# Patient Record
Sex: MALE | Race: WHITE
[De-identification: names, ages, dates, MRNs, and addresses within clinical notes are randomized per-mention and may not be internally consistent; named-entity substitution may affect disease eponyms.]

---

## 2018-08-26 ENCOUNTER — HOSPITAL ENCOUNTER (EMERGENCY)
Dept: HOSPITAL 80 - FED | Age: 33
Discharge: HOME | End: 2018-08-26
Payer: COMMERCIAL

## 2018-08-26 VITALS — DIASTOLIC BLOOD PRESSURE: 88 MMHG | SYSTOLIC BLOOD PRESSURE: 177 MMHG

## 2018-08-26 DIAGNOSIS — J20.9: Primary | ICD-10-CM

## 2018-08-26 DIAGNOSIS — R09.02: ICD-10-CM

## 2018-08-26 DIAGNOSIS — J45.909: ICD-10-CM

## 2018-08-26 NOTE — EDPHY
H & P


Stated Complaint: sob low o2 sat 1 mos hx pnuemonia strep


Time Seen by Provider: 08/26/18 19:12





- Personal History


Current Tetanus/Diphtheria Vaccine: Yes


Current Tetanus Diphtheria and Acellular Pertussis (TDAP): Yes





- Medical/Surgical History


Hx Asthma: No


Hx Chronic Respiratory Disease: No


Hx Diabetes: No


Hx Cardiac Disease: No


Hx Renal Disease: No


Hx Cirrhosis: No


Hx Alcoholism: No


Hx HIV/AIDS: No


Hx Splenectomy or Spleen Trauma: No





- Social History


Smoking Status: Current every day smoker


Constitutional: 


 Initial Vital Signs











Temperature (C)  37.2 C   08/26/18 19:07


 


Heart Rate  109 H  08/26/18 19:07


 


Respiratory Rate  20   08/26/18 19:07


 


Blood Pressure  167/133 H  08/26/18 19:07


 


O2 Sat (%)  86 L  08/26/18 19:07








 











O2 Delivery Mode               Room Air


 


O2 (L/minute)                  2














Allergies/Adverse Reactions: 


 





No Known Allergies Allergy (Unverified 08/26/18 19:06)


 








Home Medications: 














 Medication  Instructions  Recorded


 


Allegra Allergy  08/26/18


 


Azithromycin [Zithromax] 250 mg PO DAILY #6 tab 08/26/18


 


predniSONE 40 mg PO DAILY #10 tab 08/26/18














Medical Decision Making





- Diagnostics


Imaging Results: 


 Imaging Impressions





Chest X-Ray  08/26/18 19:19


Impression: 1. Airways disease.


2. There are vague noncalcified nodular densities in each upper lung. These 

could potentially be secondary to mucous plugging with or without underlying 

bronchiectasis. Is there a history of cystic fibrosis?


 


Results discussed with Dr. Ford.


 











Imaging: Discussed imaging studies w/ On call Radiologist, I viewed and 

interpreted images myself


ED Course/Re-evaluation: 





CHIEF COMPLAINT: Shortness of breath 





HISTORY OF PRESENT ILLNESS: 


The patient is a 34 y/o male with a history of asthma and hypertension 

complaining of worsening shortness of breath, onset two weeks ago. Around two 

months ago he was diagnosed with pneumonia and successfully treated with 

antibiotics. Around two weeks ago he developed a head cold associated with post-

nasal drip. The head cold progressed and he developed a cough and shortness of 

breath. Today he noticed that the shortness of breath was worse and decided to 

present to the emergency department. He denies fevers or chills. Denies chest 

pain, abdominal pain, urinary or bowel complaints, numbness, paresthesias, 

headache. 





REVIEW OF SYSTEMS:  





A 10 point review of systems was performed and is negative with the exception 

of the elements mentioned in the history of present illness.





PHYSICAL EXAM:  





HR, BP, O2 Sat, RR.  Temp noted


General Appearance:  Alert, well hydrated, appropriate, and non-toxic appearing.


Head:  Atraumatic without scalp tenderness or obvious injury


Eyes:  Pupils equal, round, reactive to light and accommodation, EOMI, no trauma

, no injection.


Ears:  Clear bilaterally, no perforation, normal landmarks


Nose:  Atraumatic, no rhinorrhea, clear.


Throat:  There is no erythema or exudates, no lesions, normal tonsils, mucus 

membranes moist.


Neck:  Supple, 2+ carotid upstroke, nontender, no lymphadenopathy.


Respiratory: Bilateral increased end-expiratory phase, end expiratory wheezes, 

coarse rhonchi throughout. No retractions, no distress, and no accessory muscle 

use. 


Cardiovascular:  Regular rate and rhythm, no murmurs, rubs, or gallops. 

Bilateral carotid, radial, dorsalis pedis, and posterior tibial pulses intact. 

Good capillary refill all extremities.


Gastrointestinal:  Abdomen is soft, nontender, non-distended, no masses, no 

rebound, no guarding, no peritoneal signs.


Musculoskeletal:  Normal active ROM of all extremities, atraumatic.


Neurological:  Alert, appropriate, and interactive.  The patient has normal 

DTRs and non-focal cranial nerves, motor, sensory, and cerebellar exam.


Skin:  No rashes, good turgor, no nodules on palpation.





Past medical history:Pneumonia, asthma, hypertension


Past surgical history: Denies


Family history: Denies


Social history: Lives in [place], employed as a CNA, smoker





DIAGNOSTICS/PROCEDURES/CRITICAL CARE TIME:  





Chest x-ray: Airways disease





DIFFERENTIAL DIAGNOSIS:   


The differential diagnosis for the patient's shortness of breath and hypoxemia 

included but was not limited to airways disease, pneumonia, myocardial 

infarction, acute mountain sickness, high altitude pulmonary edema, congestive 

heart failure, and pulmonary embolus.





MEDICAL DECISION MAKING:  


The patient is a 34 y/o male with a history of asthma and hypertension 

presenting with worsening shortness of breath and a cough, onset two weeks ago. 

On exam he has bilateral increased end-expiratory phase, end expiratory wheezes

, and coarse rhonchi throughout. His O2Sats are currently 89%. Chest x-ray 

ordered; two DuoNebs, 60mg PO Prednisone, and 500mg PO Azithromycin 

administered.





2015: Reassessed patient, he is feeling better after medications; additional 

DuoNeb administered. Chest x-ray still pending at this time.





2034: I spoke with Dr. Oppenheimer, radiologist, regarding this patient's chest 

x-ray.





2035: Reassessed patient and discussed chest x-ray findings. I have offered him 

an admission, which he has declined. He states that he will monitor his O2Sats 

as he is a CNA. He also has an appointment with his PCP to address his 

hypertension and asthma. I have prescribed him Zithromax, Prednisone, and a 

Proventil inhaler. Return precautions provided; patient is comfortable with 

this plan.








- Data Points


Medications Given: 


 








Discontinued Medications





Albuterol Sulfate (Proventil Inh Prepack)  1 mdi TAKEHOME EDNOW ONE


   Stop: 08/26/18 20:41


   Last Admin: 08/26/18 20:53 Dose:  1 mdi


Albuterol/Ipratropium (Duoneb)  3 ml IH EDNOW ONE


   Stop: 08/26/18 19:15


   Last Admin: 08/26/18 19:16 Dose:  3 ml


Albuterol/Ipratropium (Duoneb)  3 ml IH EDNOW ONE


   Stop: 08/26/18 19:19


   Last Admin: 08/26/18 19:19 Dose:  3 ml


Albuterol/Ipratropium (Duoneb)  3 ml IH EDNOW ONE


   Stop: 08/26/18 20:19


   Last Admin: 08/26/18 20:20 Dose:  3 ml


Azithromycin (Zithromax)  500 mg PO EDNOW ONE


   PRN Reason: Protocol


   Stop: 08/26/18 19:19


   Last Admin: 08/26/18 19:27 Dose:  500 mg


Prednisone (Prednisone)  60 mg PO EDNOW ONE


   Stop: 08/26/18 19:19


   Last Admin: 08/26/18 19:27 Dose:  60 mg








Departure





- Departure


Disposition: Home, Routine, Self-Care


Clinical Impression: 


 Hypoxemia





Acute bronchitis


Qualifiers:


 Bronchitis organism: other organism Qualified Code(s): J20.8 - Acute 

bronchitis due to other specified organisms





Reactive airway disease


Qualifiers:


 Asthma severity: unspecified severity Asthma persistence: unspecified Asthma 

complication type: uncomplicated Qualified Code(s): J45.909 - Unspecified asthma

, uncomplicated





Dyspnea


Qualifiers:


 Dyspnea type: shortness of breath Qualified Code(s): R06.02 - Shortness of 

breath





Condition: Good


Instructions:  Acute Bronchitis (ED), Reactive Airways Disease (ED), Dyspnea (ED

), Hypoxemia (DC)


Additional Instructions: 


1. Take the Zithromax and Prednisone as prescribed.


2. Use the Proventil inhaler as prescribed.


3. Follow-up with your primary doctor within 72 hours.  


4. Return to the Emergency Department for fever, chest pain, shortness of breath

, increasing pain or other worsening of condition.








Referrals: 


Edilberto Connelly MD [[facility] [place] [facility]] - As per Instructions


Prescriptions: 


Azithromycin [Zithromax] 250 mg PO DAILY #6 tab


predniSONE 40 mg PO DAILY #10 tab


Report Scribed for: Taye Ford


Report Scribed by: Fauzia Cardenas


Date of Report: 08/26/18


Time of Report: 19:37

## 2018-10-03 ENCOUNTER — HOSPITAL ENCOUNTER (OUTPATIENT)
Dept: HOSPITAL 80 - BMCIMAGING | Age: 33
End: 2018-10-03
Attending: FAMILY MEDICINE
Payer: COMMERCIAL

## 2018-10-03 DIAGNOSIS — Z87.01: ICD-10-CM

## 2018-10-03 DIAGNOSIS — R05: Primary | ICD-10-CM

## 2018-11-01 ENCOUNTER — HOSPITAL ENCOUNTER (OUTPATIENT)
Dept: HOSPITAL 80 - BMCIMAGING | Age: 33
End: 2018-11-01
Attending: FAMILY MEDICINE
Payer: COMMERCIAL

## 2018-11-01 DIAGNOSIS — M41.85: ICD-10-CM

## 2018-11-01 DIAGNOSIS — R05: ICD-10-CM

## 2018-11-01 DIAGNOSIS — R91.8: Primary | ICD-10-CM

## 2018-11-01 DIAGNOSIS — R07.9: ICD-10-CM

## 2018-11-01 DIAGNOSIS — R09.89: ICD-10-CM

## 2018-11-14 ENCOUNTER — HOSPITAL ENCOUNTER (OUTPATIENT)
Dept: HOSPITAL 80 - FIMAGING | Age: 33
End: 2018-11-14
Attending: FAMILY MEDICINE
Payer: COMMERCIAL

## 2018-11-14 DIAGNOSIS — Z09: Primary | ICD-10-CM
